# Patient Record
(demographics unavailable — no encounter records)

---

## 2024-11-25 NOTE — PHYSICAL EXAM
[Chaperone Present] : A chaperone was present in the examining room during all aspects of the physical examination [96403] : A chaperone was present during the pelvic exam. [Labia Majora] : normal [Labia Minora] : normal [Normal] : normal

## 2024-11-25 NOTE — PLAN
[FreeTextEntry1] : Pt is a 49y  LMP 24 p/f f/u of yeast infection. No symptoms at this time. Affirm panel done.  - f/u Affirm test  - further medical management pending results   D/w Dr. Yukhayev A Sacks, PGY 1